# Patient Record
Sex: FEMALE
[De-identification: names, ages, dates, MRNs, and addresses within clinical notes are randomized per-mention and may not be internally consistent; named-entity substitution may affect disease eponyms.]

---

## 2018-11-07 ENCOUNTER — HOSPITAL ENCOUNTER (OUTPATIENT)
Dept: HOSPITAL 88 - RAD | Age: 23
End: 2018-11-07
Attending: INTERNAL MEDICINE
Payer: COMMERCIAL

## 2018-11-07 DIAGNOSIS — K59.00: Primary | ICD-10-CM

## 2018-11-07 PROCEDURE — 74018 RADEX ABDOMEN 1 VIEW: CPT

## 2018-11-07 PROCEDURE — 81025 URINE PREGNANCY TEST: CPT

## 2018-11-07 NOTE — DIAGNOSTIC IMAGING REPORT
RADIOGRAPH(S) OF THE ABDOMEN AND PELVIS, 2 view(s)    



HISTORY:  Heartburn, constipation



COMPARISON:  None available.



FINDINGS:

General paucity of bowel gas, but no visible bowel dilation.  

No definite urinary tract calcification.



The bones are partially obscured by stool and overlying bowel gas.

Incidentally, transitional lumbosacral anatomy with a right-sided

pseudoarthrosis.



IMPRESSION: 

1.  Nonobstructive bowel gas pattern.

2.  No acute radiographic abnormality.



Signed by: Dr. Presley Schrader D.O., M.M.M. on 11/7/2018 2:09 PM